# Patient Record
Sex: MALE | Race: WHITE | NOT HISPANIC OR LATINO | URBAN - METROPOLITAN AREA
[De-identification: names, ages, dates, MRNs, and addresses within clinical notes are randomized per-mention and may not be internally consistent; named-entity substitution may affect disease eponyms.]

---

## 2021-01-22 ENCOUNTER — EMERGENCY (EMERGENCY)
Facility: HOSPITAL | Age: 31
LOS: 0 days | Discharge: HOME | End: 2021-01-22
Attending: EMERGENCY MEDICINE
Payer: MEDICAID

## 2021-01-22 VITALS
WEIGHT: 205.03 LBS | TEMPERATURE: 98 F | HEART RATE: 100 BPM | OXYGEN SATURATION: 100 % | SYSTOLIC BLOOD PRESSURE: 152 MMHG | DIASTOLIC BLOOD PRESSURE: 61 MMHG | RESPIRATION RATE: 20 BRPM | HEIGHT: 67 IN
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
## 2021-01-22 DIAGNOSIS — S82.851A DISPLACED TRIMALLEOLAR FRACTURE OF RIGHT LOWER LEG, INITIAL ENCOUNTER FOR CLOSED FRACTURE: ICD-10-CM

## 2021-01-22 DIAGNOSIS — Z20.822 CONTACT WITH AND (SUSPECTED) EXPOSURE TO COVID-19: ICD-10-CM

## 2021-01-22 DIAGNOSIS — M25.571 PAIN IN RIGHT ANKLE AND JOINTS OF RIGHT FOOT: ICD-10-CM

## 2021-01-22 PROCEDURE — 27816 TREATMENT OF ANKLE FRACTURE: CPT

## 2021-01-22 PROCEDURE — U0005: CPT

## 2021-01-22 PROCEDURE — 76376 3D RENDER W/INTRP POSTPROCES: CPT | Mod: 26

## 2021-01-22 PROCEDURE — 99285 EMERGENCY DEPT VISIT HI MDM: CPT | Mod: 25

## 2021-01-22 PROCEDURE — 73610 X-RAY EXAM OF ANKLE: CPT | Mod: 26,RT

## 2021-01-22 PROCEDURE — 76376 3D RENDER W/INTRP POSTPROCES: CPT

## 2021-01-22 PROCEDURE — 27816 TREATMENT OF ANKLE FRACTURE: CPT | Mod: RT

## 2021-01-22 PROCEDURE — 73600 X-RAY EXAM OF ANKLE: CPT | Mod: RT

## 2021-01-22 PROCEDURE — 73590 X-RAY EXAM OF LOWER LEG: CPT | Mod: RT

## 2021-01-22 PROCEDURE — 73700 CT LOWER EXTREMITY W/O DYE: CPT | Mod: 26,RT

## 2021-01-22 PROCEDURE — 73700 CT LOWER EXTREMITY W/O DYE: CPT | Mod: RT

## 2021-01-22 PROCEDURE — 73610 X-RAY EXAM OF ANKLE: CPT | Mod: RT

## 2021-01-22 PROCEDURE — U0003: CPT

## 2021-01-22 PROCEDURE — 99284 EMERGENCY DEPT VISIT MOD MDM: CPT

## 2021-01-22 PROCEDURE — 73590 X-RAY EXAM OF LOWER LEG: CPT | Mod: 26,RT

## 2021-01-22 RX ORDER — ASPIRIN/CALCIUM CARB/MAGNESIUM 324 MG
1 TABLET ORAL
Qty: 14 | Refills: 0
Start: 2021-01-22 | End: 2021-02-04

## 2021-01-22 NOTE — ED STATDOCS - CARE PROVIDER_API CALL
VIDHYA DOCTOR  Phone: [phone]  Fax: (   )    -  Follow Up Time:    Sulaiman Perez (DO)  Orthopaedic Surgery  [address] [place] [address]  Phone: [phone]  Fax: [phone]  Follow Up Time:

## 2021-01-22 NOTE — ED ADULT NURSE NOTE - OBJECTIVE STATEMENT
Patient presents to the ED c/o right ankle pain. Pt states he injured his ankle skateboarding in [place] x3 days ago Sustained eversion injury to right ankle, had XR that showed fracture, was splinted. He called MD Perez, who told him to come to ED for further evaluation. Denies paraesthesias, has some pain, took pain meds PTA.

## 2021-01-22 NOTE — ED STATDOCS - CARE PROVIDERS DIRECT ADDRESSES
,DirectAddress_Unknown ,[email or web address]

## 2021-01-22 NOTE — CONSULT NOTE ADULT - ASSESSMENT
Assessment/Plan:  30y Male with RIGHT Trimalleolar ankle fracture    -Pain control as needed  -NWB RIGHT Lower Extremity in trilam splint  -Keep splint clean, dry, and intact  -Ice and elevation encouraged  -Wiggle toes periodically   -Pt educated on signs/symptoms of compartment syndrome, instructed to return to ER if s/s present, patient expresses full understanding  -Need for for surgical intervention in future discussed  -CT for surgical planning   -No acute/emergent orthopedic surgical intervention needed at this time  -Follow up with Dr. Perez in 3-5 days, please call office for appointment  -Please discharge on Aspirin 325 qD for DVT/PE ppx  -Discussed with attending, who agrees with plan, and will advise if plan changes   -Ortho stable for discharge   Assessment/Plan:  30y Male with RIGHT Trimalleolar ankle fracture    -Pain control as needed  -NWB RIGHT Lower Extremity in trilam splint  -Keep splint clean, dry, and intact  -Ice and elevation encouraged  -Wiggle toes periodically   -Pt educated on signs/symptoms of compartment syndrome, instructed to return to ER if s/s present, patient expresses full understanding  -Need for for surgical intervention in future discussed  -CT for surgical planning   -No acute/emergent orthopedic surgical intervention needed at this time  -Follow up with Dr. Perez on Monday 1/25/2021, please call office for appointment  -Please discharge on Aspirin 325 qD for DVT/PE ppx  -Discussed with attending, who agrees with plan, and will advise if plan changes   -Ortho stable for discharge   Assessment/Plan:  30y Male with RIGHT Trimalleolar ankle fracture    -Pain control as needed  -NWB RIGHT Lower Extremity in trilam splint  -Keep splint clean, dry, and intact  -Ice and elevation encouraged  -Wiggle toes periodically   -Pt educated on signs/symptoms of compartment syndrome, instructed to return to ER if s/s present, patient expresses full understanding  -Need for for surgical intervention in future discussed  -CT for surgical planning   -No acute/emergent orthopedic surgical intervention needed at this time  -Follow up with Dr. Perez on Monday 1/25/2021, please call office for appointment  -FU COVID test  -Please discharge on Aspirin 325 qD for DVT/PE ppx  -Discussed with attending, who agrees with plan, and will advise if plan changes   -Ortho stable for discharge

## 2021-01-22 NOTE — CONSULT NOTE ADULT - SUBJECTIVE AND OBJECTIVE BOX
30y Male presents with RIGHT ankle fracture s/p skateboarding accident 3 days ago while in [place]. Pt states he presented to the ER at [facility] in [place], [place]. Was told the hospital does not take his insurance. Was not seen by orthopedic doctor. No reduction attempt was made at that time. Was placed in splint and returned home to find orthopedic doctor. Was not placed on DVT ppx. Denies numbness/tingling. Denies head strike/LOC/other orthopedic injuries. Patient ambulates without assistance at baseline.    PAST MEDICAL & SURGICAL HISTORY:  No pertinent past medical history      Home Medications:    Allergies    No Known Allergies    Intolerances              Vital Signs Last 24 Hrs  T(C): 36.7 (22 Jan 2021 18:41), Max: 36.7 (22 Jan 2021 18:41)  T(F): 98.1 (22 Jan 2021 18:41), Max: 98.1 (22 Jan 2021 18:41)  HR: 100 (22 Jan 2021 18:41) (100 - 100)  BP: 152/61 (22 Jan 2021 18:41) (152/61 - 152/61)  BP(mean): 84 (22 Jan 2021 18:41) (84 - 84)  RR: 20 (22 Jan 2021 18:41) (20 - 20)  SpO2: 100% (22 Jan 2021 18:41) (100% - 100%)      PHYSICAL EXAM  GEN: NAD, Awake and Alert    RIGHT Lower Extremity:   Skin intact  + Ecchymosis about the ankle   + Swelling about the ankle   + TTP over the medial and lateral malleoli  NTTP over the bony prominences of the hip/knee/foot/toes  Pain with attempted ROM of the ankle  Painless passive/active ROM of the hip/knee/foot/toes  L2-S1 SILT  Wiggles toes  +EHL/FHL  +DP pulses  Compartments soft and compressible  Calf nontender      SECONDARY EXAM: Benign, Skin intact, SILT throughout, motor grossly intact throughout, no other orthopedic injuries at this time, compartments soft and compressible    SPINE: Skin intact, no bony tenderness or step-offs appreciated throughout cervical/thoracic/lumbar/sacral spine    B/l UE: Skin intact, no erythema, ecchymosis, edema, gross deformity, NTTP over the bony prominences of the shoulder/elbow/wrist/hand, painless passive/active ROM of the shoulder/elbow/wrist/hand, C5-T1 SILT, motor grossly intact throughout axillary/musculocutaneous/radial/median/ulnar nerves, + radial pulse    LLE: Skin intact, no erythema, ecchymosis, edema, gross deformity, NTTP over the bony prominences of the hip/knee/ankle/foot, painless passive/active ROM of the hip/knee/ankle/foot, L2-S1 SILT, motor grossly intact throughout Hip Flexors/Quadriceps/Hamstrings/TA/EHL/FHL/GSC, + DP pulses, no pain with log roll, no pain on axial loading, compartments soft and compressible, calf nontender      IMAGING  XR RIGHT Ankle: Trimalleolar equivalent ankle fx with subluxation of ankle joint.  XR RIGHT Tibia/Fibula: No evidence of other fracture or dislocation.    Procedure:  Procedure explained and risks, benefits, and alternatives to procedure discussed with patient at bedside. Patient expressed full understanding and all questions were answered. Under aseptic conditions, a hematoma block was administered to the fracture site using 10cc of 1% lidocaine. Closed reduction was performed and a well molded plaster splint was applied to the affected extremity. The patient tolerated the procedure well and there we no complications. The patient was neurovascularly intact following reduction. Post-reduction x-rays demonstrated acceptable alignment.

## 2021-01-22 NOTE — ED STATDOCS - OBJECTIVE STATEMENT
29 y/o male with no pertinent PMHx presents to the ED c/o right ankle pain. Pt states he injured his ankle skateboarding in [place] x3 days ago Sustained eversion injury to right ankle, had XR that showed fracture, was splinted. He called MD Perez, who told him to come to ED for further evaluation. Denies paraesthesias, has some pain, took pain meds PTA. Denies any other injuries.

## 2021-01-22 NOTE — ED STATDOCS - PROGRESS NOTE DETAILS
Pt. is a 30 year old male presenting with right ankle pain.  Pt. states he was skateboarding while in [place] and broke ankle.  Pt. was evaluated and splinted at a hospital and was told to follow with an orthopedist.  Pt. flew back to [place] and was referred to Dr. Armenta.  The office sent him here.  Pain medications taken PTA.  Splint to RLE in place.  Sylwia Brown PA-C Bimalleolar on xray.  Orthopedics to consult.  Sylwia Brown PA-C Pt. evaluated by orthopedic resident.  Requested COVID swab.  Obtaining CT for better imaging.  Splint in place by them.  Sylwia Brown PA-C Pt. due to call Dr. Perez [phone] on Monday with  QD.  Sylwia Brown PA-C

## 2021-01-22 NOTE — ED STATDOCS - PROVIDER TOKENS
FREE:[LAST:[BITTERMAN],FIRST:[DOCTOR],PHONE:[phone]],FAX:[(   [phone]    -]] PROVIDER:[TOKEN:[[id number]]]

## 2021-01-22 NOTE — ED STATDOCS - PATIENT PORTAL LINK FT
You can access the FollowMyHealth Patient Portal offered by [facility] by registering at the following website: [email or web address]. By joining [facility]’s FollowMyHealth portal, you will also be able to view your health information using other applications (apps) compatible with our system.

## 2021-01-22 NOTE — ED ADULT TRIAGE NOTE - CHIEF COMPLAINT QUOTE
PT C/O RIGHT ANKLE PAIN, PT STATES HE INJURED/FRACTURED HIS ANKLE FROM SKATEBOARDING IN [place] 3 DAYS AGO, THE HOSPITAL DID NOT TAKE HIS INSURANCE, AND FLEW BACK TO [place], MD KEE TOLD HIM TO COME TO ED RIGHT AWAY TO GET HIS ANKLE EVALUATED.  PT TOOK NORCO, TORADOL 2 HRS AGO.

## 2021-01-22 NOTE — ED STATDOCS - NSFOLLOWUPINSTRUCTIONS_ED_ALL_ED_FT
Closed Reduction for Ankle Fracture or Dislocation, Care After      This sheet gives you information about how to care for yourself after your procedure. Your health care provider may also give you more specific instructions. If you have problems or questions, contact your health care provider.      What can I expect after the procedure?  After the procedure, it is common to have:  •Pain or discomfort.      •Swelling.      •Bruising or bluish discoloration.        Follow these instructions at home:    Medicines     •Take over-the-counter and prescription medicines only as told by your health care provider.    •Ask your health care provider if the medicine prescribed to you:   •Requires you to avoid driving or using heavy machinery.     •Can cause constipation. You may need to take actions to prevent or treat constipation, such as:  •Drink enough fluid to keep your urine pale yellow.      •Take over-the-counter or prescription medicines.      •Eat foods that are high in fiber, such as beans, whole grains, and fresh fruits and vegetables.       •Limit foods that are high in fat and processed sugars, such as fried or sweet foods.          If you have a cast, splint, or boot:     •Check the skin around it every day. Tell your health care provider about any concerns.      •Keep it clean.    •If the cast, splint, boot is not waterproof:  •Do not let it get wet.      •Cover it with a watertight covering when you take a bath or shower.        If you have a cast:     • Do not put pressure on any part of the cast until it is fully hardened. This may take several hours.      • Do not stick anything inside the cast to scratch your skin. Doing that increases your risk of infection.      •You may put lotion on dry skin around the edges of the cast. Do not put lotion on the skin underneath the cast.      If you have a splint or boot:     •Wear the splint or boot as told by your health care provider. Remove it only as told by your health care provider.      •Loosen the splint or boot if your toes tingle, become numb, or turn cold and blue.        Managing pain, stiffness, and swelling    •If directed, put ice on your ankle area.  •If you have a removable splint or boot, remove it as told by your health care provider.       •Put ice in a plastic bag.      •Place a towel between your skin and the bag or between your cast and the bag.      •Leave the ice on for 20 minutes, 2–3 times a day.        •Move your toes often to reduce stiffness and swelling.      •Raise (elevate) your ankle above the level of your heart while you are sitting or lying down.      Activity     •Return to your normal activities as told by your health care provider. Ask your health care provider what activities are safe for you.      • Do not use the injured limb to support your body weight until your health care provider says that you can. This may take several weeks. Use crutches, a walker, or a wheelchair as told by your health care provider.      •Do exercises such as specific physical therapy for your ankle as instructed by your health care provider.      Driving     • Do not drive for 24 hours if you were given a sedative during your procedure.      •Ask your health care provider when it is safe to drive if you have a cast, splint, or boot on the foot that you use for driving.      General instructions     • Do not drink alcohol if your health care provider tells you not to drink.      • Do not use any products that contain nicotine or tobacco, such as cigarettes, e-cigarettes, and chewing tobacco. These can delay bone healing. If you need help quitting, ask your health care provider.      •Keep all follow-up visits as told by your health care provider. This is important.        Contact a health care provider if you have:    •A fever.      •Pain that is not controlled by your pain medicine.        Get help right away if you have:    •A severe increase in pain or swelling.      •Toes that tingle or become numb.      •Loss of feeling in your leg, foot, or ankle.      •Toes that are cold and blue.      •Pain, tenderness, or redness in your calf.      •A new rash around your ankle or cast, or if you notice any leakage of fluid from the skin.      •Bleeding around the ankle or underneath or around the cast.      •Chest pain.      •Difficulty breathing.        Summary    •After the procedure, it is common to have pain or discomfort, swelling, and bruising or bluish discoloration around your ankle.      •Follow instructions for taking care of your injury as you recover at home. You may be given specific instructions for taking medicines and wearing a boot, cast, or splint.      •Use ice and medicine to control swelling and pain. Follow instructions on not bearing weight on your injured ankle. You may have to use crutches, a walker, or a wheelchair.      •Contact a health care provider if you have a fever or pain that does not go away.      •Get help right away if your pain worsens or your toes tingle, become numb, or turn cold or blue. Also, get help right away if you have chest pain, tenderness in your calf, a rash around your ankle or cast, or bleeding around the ankle.      This information is not intended to replace advice given to you by your health care provider. Make sure you discuss any questions you have with your health care provider. CALL DR KEE MONDAY -[phone]    Closed Reduction for Ankle Fracture or Dislocation, Care After      This sheet gives you information about how to care for yourself after your procedure. Your health care provider may also give you more specific instructions. If you have problems or questions, contact your health care provider.      What can I expect after the procedure?  After the procedure, it is common to have:  •Pain or discomfort.      •Swelling.      •Bruising or bluish discoloration.        Follow these instructions at home:    Medicines     •Take over-the-counter and prescription medicines only as told by your health care provider.    •Ask your health care provider if the medicine prescribed to you:   •Requires you to avoid driving or using heavy machinery.     •Can cause constipation. You may need to take actions to prevent or treat constipation, such as:  •Drink enough fluid to keep your urine pale yellow.      •Take over-the-counter or prescription medicines.      •Eat foods that are high in fiber, such as beans, whole grains, and fresh fruits and vegetables.       •Limit foods that are high in fat and processed sugars, such as fried or sweet foods.          If you have a cast, splint, or boot:     •Check the skin around it every day. Tell your health care provider about any concerns.      •Keep it clean.    •If the cast, splint, boot is not waterproof:  •Do not let it get wet.      •Cover it with a watertight covering when you take a bath or shower.        If you have a cast:     • Do not put pressure on any part of the cast until it is fully hardened. This may take several hours.      • Do not stick anything inside the cast to scratch your skin. Doing that increases your risk of infection.      •You may put lotion on dry skin around the edges of the cast. Do not put lotion on the skin underneath the cast.      If you have a splint or boot:     •Wear the splint or boot as told by your health care provider. Remove it only as told by your health care provider.      •Loosen the splint or boot if your toes tingle, become numb, or turn cold and blue.        Managing pain, stiffness, and swelling    •If directed, put ice on your ankle area.  •If you have a removable splint or boot, remove it as told by your health care provider.       •Put ice in a plastic bag.      •Place a towel between your skin and the bag or between your cast and the bag.      •Leave the ice on for 20 minutes, 2–3 times a day.        •Move your toes often to reduce stiffness and swelling.      •Raise (elevate) your ankle above the level of your heart while you are sitting or lying down.      Activity     •Return to your normal activities as told by your health care provider. Ask your health care provider what activities are safe for you.      • Do not use the injured limb to support your body weight until your health care provider says that you can. This may take several weeks. Use crutches, a walker, or a wheelchair as told by your health care provider.      •Do exercises such as specific physical therapy for your ankle as instructed by your health care provider.      Driving     • Do not drive for 24 hours if you were given a sedative during your procedure.      •Ask your health care provider when it is safe to drive if you have a cast, splint, or boot on the foot that you use for driving.      General instructions     • Do not drink alcohol if your health care provider tells you not to drink.      • Do not use any products that contain nicotine or tobacco, such as cigarettes, e-cigarettes, and chewing tobacco. These can delay bone healing. If you need help quitting, ask your health care provider.      •Keep all follow-up visits as told by your health care provider. This is important.        Contact a health care provider if you have:    •A fever.      •Pain that is not controlled by your pain medicine.        Get help right away if you have:    •A severe increase in pain or swelling.      •Toes that tingle or become numb.      •Loss of feeling in your leg, foot, or ankle.      •Toes that are cold and blue.      •Pain, tenderness, or redness in your calf.      •A new rash around your ankle or cast, or if you notice any leakage of fluid from the skin.      •Bleeding around the ankle or underneath or around the cast.      •Chest pain.      •Difficulty breathing.        Summary    •After the procedure, it is common to have pain or discomfort, swelling, and bruising or bluish discoloration around your ankle.      •Follow instructions for taking care of your injury as you recover at home. You may be given specific instructions for taking medicines and wearing a boot, cast, or splint.      •Use ice and medicine to control swelling and pain. Follow instructions on not bearing weight on your injured ankle. You may have to use crutches, a walker, or a wheelchair.      •Contact a health care provider if you have a fever or pain that does not go away.      •Get help right away if your pain worsens or your toes tingle, become numb, or turn cold or blue. Also, get help right away if you have chest pain, tenderness in your calf, a rash around your ankle or cast, or bleeding around the ankle.      This information is not intended to replace advice given to you by your health care provider. Make sure you discuss any questions you have with your health care provider.

## 2021-01-23 LAB — SARS-COV-2 RNA SPEC QL NAA+PROBE: SIGNIFICANT CHANGE UP

## 2021-01-25 ENCOUNTER — APPOINTMENT (OUTPATIENT)
Dept: ORTHOPEDIC SURGERY | Facility: CLINIC | Age: 31
End: 2021-01-25
Payer: MEDICAID

## 2021-01-25 VITALS
BODY MASS INDEX: 32.96 KG/M2 | HEIGHT: 67 IN | DIASTOLIC BLOOD PRESSURE: 85 MMHG | SYSTOLIC BLOOD PRESSURE: 132 MMHG | WEIGHT: 210 LBS | HEART RATE: 90 BPM

## 2021-01-25 PROBLEM — Z00.00 ENCOUNTER FOR PREVENTIVE HEALTH EXAMINATION: Status: ACTIVE | Noted: 2021-01-25

## 2021-01-25 PROCEDURE — 99072 ADDL SUPL MATRL&STAF TM PHE: CPT

## 2021-01-25 PROCEDURE — 99204 OFFICE O/P NEW MOD 45 MIN: CPT

## 2021-01-26 ENCOUNTER — APPOINTMENT (OUTPATIENT)
Dept: DISASTER EMERGENCY | Facility: CLINIC | Age: 31
End: 2021-01-26

## 2021-01-26 DIAGNOSIS — Z01.818 ENCOUNTER FOR OTHER PREPROCEDURAL EXAMINATION: ICD-10-CM

## 2021-01-27 LAB — SARS-COV-2 N GENE NPH QL NAA+PROBE: NOT DETECTED

## 2021-01-27 NOTE — DISCUSSION/SUMMARY
[de-identified] : Today I had a lengthy discussion with the patient regarding their right ankle pain. I have addressed all the patient's concerns surrounding the pathology of their condition. I advised the patient to utilize 325 mg of aspirin as instructed for blood thinning purposes. A discussion was had about a right ankle ORIF surgery. A lengthy discussion was had about the surgery. All risks, benefits and alternatives to the recommended surgical procedure were discussed which include but are not limited to bleeding, infection, nerve damage, vascular damage, failure of the wound to heal, the need for further surgery, loss of limb, DVT, PE, loss of life as well as the risks associated with general anesthesia. The patient verbalized understanding and provided informed consent to move forward with surgery. The patient understood and verbally agreed to the treatment plan. All of their questions were answered and they were satisfied with the visit. The patient should call the office if they have any questions or experience worsening symptoms.

## 2021-01-27 NOTE — ADDENDUM
[FreeTextEntry1] : I, Jhoan Stephen, acted solely as a scribe for Dr. uSlaiman Perez on this date 01/25/2021 .\par All medical record entries made by the Scribe were at my, Dr. Sulaiman Perez, direction and personally dictated by me on 01/25/2021 . I have reviewed the chart and agree that the record accurately reflects my personal performance of the history, physical exam, assessment and plan. I have also personally directed, reviewed, and agreed with the chart.

## 2021-01-27 NOTE — CONSULT LETTER
[Consult Letter:] : I had the pleasure of evaluating your patient, [unfilled]. [Please see my note below.] : Please see my note below. [Consult Closing:] : Thank you very much for allowing me to participate in the care of this patient.  If you have any questions, please do not hesitate to contact me. [Sincerely,] : Sincerely, [FreeTextEntry3] : Sulaiman Perez, DO\par Foot and Ankle Surgery\par

## 2021-01-27 NOTE — REVIEW OF SYSTEMS
[Joint Pain] : joint pain [Joint Swelling] : joint swelling [Negative] : Heme/Lymph [FreeTextEntry9] : R ankle

## 2021-01-27 NOTE — PHYSICAL EXAM
[de-identified] : Right Ankle Exam\par \par General: Alert and oriented x3.  In no acute distress.  Pleasant in nature with a normal affect.  No apparent respiratory distress. \par Erythema, Warmth, Rubor: Negative\par Swelling: Positive swelling.  No fractures blisters present after windowing the splint.\par \par Pulses: 2+ DP/PT Pulses\par \par Neuro: Intact motor and sensory\par \par  [de-identified] : \par EXAM: XR TIB FIB AP LAT 2 VIEWS RT\par \par EXAM: XR ANKLE POST RED 2 VIEWS RT\par \par EXAM: XR ANKLE COMP MIN 3 VIEWS RT\par \par \par PROCEDURE DATE: 01/22/2021\par \par \par \par INTERPRETATION: CLINICAL INDICATION: right ankle injury pain and swelling\par \par EXAM:\par Frontal, oblique, and lateral views of the right ankle pre and post reduction and AP and lateral right leg from 1/22/2021.\par \par IMPRESSION:\par Splint material surrounds the extremity.\par \par Posteriorly displaced obliquely oriented distal fibular fracture extending to joint line level and vertically oriented distal tibial posterior malleolar fracture.\par \par Widened medial mortise clear space indicative of concomitant ligamentous injury and instability as well.\par \par Postreduction images demonstrate slightly decreased fracture fragment displacement and restored ankle mortise congruence.\par \par No dislocations or additional fractures in remaining imaged regions. No stigmata of pre-existing underlying arthropathy in the knee ankle and visualized midfoot and hindfoot joint spaces.\par \par \par \par \par \par BALWINDER DORAN MD; Attending Radiologist\par This document has been electronically signed. Jan 23 2021 3:20PM

## 2021-01-27 NOTE — HISTORY OF PRESENT ILLNESS
[FreeTextEntry1] : Oscar is a 29 yo male who presents with a right ankle fracture after he fell in [place] on January 20, 2020.  He fell off his skateboard and fractured the ankle.  Pain scale 8/10.  He presents today non weight bearing in a splint with crutches. The patient full sensation in his foot.  No other complaints.

## 2021-01-28 RX ORDER — MEPERIDINE HYDROCHLORIDE 50 MG/ML
12.5 INJECTION INTRAMUSCULAR; INTRAVENOUS; SUBCUTANEOUS
Refills: 0 | Status: DISCONTINUED | OUTPATIENT
Start: 2021-01-29 | End: 2021-01-29

## 2021-01-28 RX ORDER — ONDANSETRON 8 MG/1
4 TABLET, FILM COATED ORAL ONCE
Refills: 0 | Status: DISCONTINUED | OUTPATIENT
Start: 2021-01-29 | End: 2021-01-29

## 2021-01-28 RX ORDER — HYDROMORPHONE HYDROCHLORIDE 2 MG/ML
0.5 INJECTION INTRAMUSCULAR; INTRAVENOUS; SUBCUTANEOUS
Refills: 0 | Status: DISCONTINUED | OUTPATIENT
Start: 2021-01-29 | End: 2021-01-29

## 2021-01-28 RX ORDER — SODIUM CHLORIDE 9 MG/ML
1000 INJECTION, SOLUTION INTRAVENOUS
Refills: 0 | Status: DISCONTINUED | OUTPATIENT
Start: 2021-01-29 | End: 2021-01-29

## 2021-01-28 RX ORDER — FENTANYL CITRATE 50 UG/ML
50 INJECTION INTRAVENOUS
Refills: 0 | Status: DISCONTINUED | OUTPATIENT
Start: 2021-01-29 | End: 2021-01-29

## 2021-01-29 ENCOUNTER — RESULT REVIEW (OUTPATIENT)
Age: 31
End: 2021-01-29

## 2021-01-29 ENCOUNTER — APPOINTMENT (OUTPATIENT)
Dept: ORTHOPEDIC SURGERY | Facility: HOSPITAL | Age: 31
End: 2021-01-29

## 2021-01-29 ENCOUNTER — OUTPATIENT (OUTPATIENT)
Dept: INPATIENT UNIT | Facility: HOSPITAL | Age: 31
LOS: 1 days | Discharge: HOME | End: 2021-01-29
Payer: MEDICAID

## 2021-01-29 VITALS
DIASTOLIC BLOOD PRESSURE: 91 MMHG | RESPIRATION RATE: 18 BRPM | TEMPERATURE: 99 F | OXYGEN SATURATION: 97 % | SYSTOLIC BLOOD PRESSURE: 146 MMHG | HEART RATE: 97 BPM

## 2021-01-29 VITALS
RESPIRATION RATE: 14 BRPM | SYSTOLIC BLOOD PRESSURE: 138 MMHG | OXYGEN SATURATION: 98 % | DIASTOLIC BLOOD PRESSURE: 92 MMHG | HEART RATE: 101 BPM | WEIGHT: 210.1 LBS | HEIGHT: 67 IN | TEMPERATURE: 97 F

## 2021-01-29 DIAGNOSIS — S82.851A DISPLACED TRIMALLEOLAR FRACTURE OF RIGHT LOWER LEG, INITIAL ENCOUNTER FOR CLOSED FRACTURE: ICD-10-CM

## 2021-01-29 DIAGNOSIS — K40.20 BILATERAL INGUINAL HERNIA, WITHOUT OBSTRUCTION OR GANGRENE, NOT SPECIFIED AS RECURRENT: Chronic | ICD-10-CM

## 2021-01-29 PROCEDURE — 27822 TREATMENT OF ANKLE FRACTURE: CPT | Mod: 58,RT

## 2021-01-29 PROCEDURE — 76000 FLUOROSCOPY <1 HR PHYS/QHP: CPT

## 2021-01-29 PROCEDURE — 29898 ANKLE ARTHROSCOPY/SURGERY: CPT | Mod: 58,RT

## 2021-01-29 PROCEDURE — C1713: CPT

## 2021-01-29 PROCEDURE — 27829 TREAT LOWER LEG JOINT: CPT | Mod: 58,RT

## 2021-01-29 RX ORDER — OXYCODONE HYDROCHLORIDE 5 MG/1
10 TABLET ORAL ONCE
Refills: 0 | Status: DISCONTINUED | OUTPATIENT
Start: 2021-01-29 | End: 2021-01-29

## 2021-01-29 RX ORDER — ONDANSETRON 4 MG/1
4 TABLET ORAL
Qty: 30 | Refills: 0 | Status: ACTIVE | COMMUNITY
Start: 2021-01-29 | End: 1900-01-01

## 2021-01-29 RX ORDER — ASPIRIN 325 MG/1
325 TABLET, FILM COATED ORAL
Qty: 30 | Refills: 0 | Status: ACTIVE | COMMUNITY
Start: 2021-01-29 | End: 1900-01-01

## 2021-01-29 RX ORDER — ONDANSETRON 8 MG/1
1 TABLET, FILM COATED ORAL
Qty: 10 | Refills: 0
Start: 2021-01-29 | End: 2021-02-04

## 2021-01-29 RX ORDER — CELECOXIB 200 MG/1
200 CAPSULE ORAL ONCE
Refills: 0 | Status: COMPLETED | OUTPATIENT
Start: 2021-01-29 | End: 2021-01-29

## 2021-01-29 RX ORDER — FAMOTIDINE 10 MG/ML
20 INJECTION INTRAVENOUS ONCE
Refills: 0 | Status: COMPLETED | OUTPATIENT
Start: 2021-01-29 | End: 2021-01-29

## 2021-01-29 RX ORDER — METOCLOPRAMIDE HCL 10 MG
10 TABLET ORAL ONCE
Refills: 0 | Status: COMPLETED | OUTPATIENT
Start: 2021-01-29 | End: 2021-01-29

## 2021-01-29 RX ORDER — OXYCODONE 5 MG/1
5 TABLET ORAL
Qty: 30 | Refills: 0 | Status: ACTIVE | COMMUNITY
Start: 2021-01-29 | End: 1900-01-01

## 2021-01-29 RX ORDER — DOCUSATE SODIUM 100 MG
1 CAPSULE ORAL
Qty: 60 | Refills: 0
Start: 2021-01-29 | End: 2021-02-12

## 2021-01-29 RX ORDER — ACETAMINOPHEN 500 MG
975 TABLET ORAL ONCE
Refills: 0 | Status: COMPLETED | OUTPATIENT
Start: 2021-01-29 | End: 2021-01-29

## 2021-01-29 RX ORDER — ASPIRIN/CALCIUM CARB/MAGNESIUM 324 MG
1 TABLET ORAL
Qty: 30 | Refills: 0
Start: 2021-01-29 | End: 2021-02-27

## 2021-01-29 RX ORDER — LABETALOL HCL 100 MG
10 TABLET ORAL ONCE
Refills: 0 | Status: COMPLETED | OUTPATIENT
Start: 2021-01-29 | End: 2021-01-29

## 2021-01-29 RX ORDER — MUPIROCIN 20 MG/G
0 OINTMENT TOPICAL
Qty: 0 | Refills: 0 | DISCHARGE

## 2021-01-29 RX ORDER — DOCUSATE SODIUM 100 MG/1
100 CAPSULE ORAL TWICE DAILY
Qty: 30 | Refills: 0 | Status: ACTIVE | COMMUNITY
Start: 2021-01-29 | End: 1900-01-01

## 2021-01-29 RX ORDER — METOPROLOL TARTRATE 50 MG
3 TABLET ORAL ONCE
Refills: 0 | Status: COMPLETED | OUTPATIENT
Start: 2021-01-29 | End: 2021-01-29

## 2021-01-29 RX ORDER — OXYCODONE HYDROCHLORIDE 5 MG/1
1 TABLET ORAL
Qty: 20 | Refills: 0
Start: 2021-01-29 | End: 2021-02-04

## 2021-01-29 RX ADMIN — HYDROMORPHONE HYDROCHLORIDE 0.5 MILLIGRAM(S): 2 INJECTION INTRAMUSCULAR; INTRAVENOUS; SUBCUTANEOUS at 15:06

## 2021-01-29 RX ADMIN — Medication 3 MILLIGRAM(S): at 13:33

## 2021-01-29 RX ADMIN — Medication 10 MILLIGRAM(S): at 09:01

## 2021-01-29 RX ADMIN — OXYCODONE HYDROCHLORIDE 10 MILLIGRAM(S): 5 TABLET ORAL at 13:58

## 2021-01-29 RX ADMIN — Medication 975 MILLIGRAM(S): at 09:01

## 2021-01-29 RX ADMIN — CELECOXIB 200 MILLIGRAM(S): 200 CAPSULE ORAL at 09:01

## 2021-01-29 RX ADMIN — FAMOTIDINE 20 MILLIGRAM(S): 10 INJECTION INTRAVENOUS at 09:01

## 2021-01-29 RX ADMIN — HYDROMORPHONE HYDROCHLORIDE 0.5 MILLIGRAM(S): 2 INJECTION INTRAMUSCULAR; INTRAVENOUS; SUBCUTANEOUS at 15:11

## 2021-01-29 RX ADMIN — FENTANYL CITRATE 50 MICROGRAM(S): 50 INJECTION INTRAVENOUS at 13:58

## 2021-01-29 RX ADMIN — FENTANYL CITRATE 50 MICROGRAM(S): 50 INJECTION INTRAVENOUS at 14:07

## 2021-01-29 RX ADMIN — Medication 10 MILLIGRAM(S): at 14:53

## 2021-01-29 NOTE — ASU DISCHARGE PLAN (ADULT/PEDIATRIC) - ASU DC SPECIAL INSTRUCTIONSFT
Post-Operative Instructions    PRESCRIPTIONS: your post-operative medications have been handed to you or sent to the pharmacy you indicated at your pre-operative visit.  If you have any difficulty obtaining your post-operative medications or have any questions, please call the office at [phone].      PAIN MANAGEMENT: You should expect to have discomfort for the first week or so after surgery. Pain medication should be taken to help alleviate the pain so that you are comfortable and can participate in physical therapy.  Take the medication as directed.  You may decrease the amount of pain medication, as tolerated, when pain improves.  You must exercise caution when operating a motor vehicle. You have been prescribed one or more of the following as indicated on your Med Rec form that the Nurse will go over with you:  [  ] Oxycodone 5mg 1-2 tablets by mouth every 4 to 6 hours as needed for pain  Oxycodone is a short-acting pain medication routinely prescribed after surgery.  It is the pain medication found in “Percocet,” which is a combination of oxycodone and Tylenol.  If you were prescribed oxycodone, you may take Tylenol in addition to this medication, if needed for pain control.  [  ] Oxycontin 10mg by mouth every 12 hours for 5 days  Oxycontin is a long-acting pain medication.  It is sometimes prescribed after longer surgeries. If you were prescribed this medication, you should take it twice a day for the first 5 days after surgery to help with pain control.  [  ] Vicodin or Norco (Hydrocodone 5mg/Tylenol 325mg) 1-2 tablets by mouth every 4-6 hours as needed for pain  Vicodin and Norco are short acting pain medications sometimes prescribed after surgery.  If you were prescribed this medication, you may take 1-2 tablets every 4-6 hours as needed for pain.  This medication already contains Tylenol.  You should NOT take any additional Tylenol (acetaminophen) if you are taking these medications.    NAUSEA: Nausea is a common side effect of anesthesia and pain medications.  You may take the below medication if you are experiencing nausea after surgery.  If you continue to experience nausea or vomiting more than 24 hours after surgery, please call the office.  [  ] Ondansetron 4mg by mouth every 4 hours as needed for nausea    CONSTIPATION: common side effect of anesthesia and pain medications.  You should take Colace three times daily, as long as you are taking narcotic pain medications after surgery, such as oxycodone, oxycontin, vicodin, or norco.  [  ] Colace 100mg by mouth three times daily    DVT PROPHYLAXIS (PREVENTION OF BLOOD CLOTS): Aspirin EC can reduce the risk of blood clots after surgery, particularly after surgery on the legs, ankles and feet.  If you have been prescribed Aspirin, it is essential that you take this medication.  If you already are on an anticoagulant (blood thinner) such as Xarelto, Coumadin, Warfarin, Eliquis - you should resume you home "blood-thinner" in place of the Aspirin.  [  ] Aspirin 325mg ENTERIC COATED (EC) by mouth once daily for 2-4 weeks (depending on surgical procedure)    ACTIVITY: You should be up and moving as much and as often as possible! Do NOT walk or put bodyweight on your splint or surgical side. Use Crutches or walker. You must keep your bandage/splint dry. Do NOT get it wet. IF you shower it MUST be covered tightly with a garbage bag. Otherwise sponge bath is advised. You do NOT want wet bandages on your skin as they can cause skin breakdown under the splint.    BANDAGE: Your bandage will be changed in the office. Do NOT remove it yourself. IF it gets damaged or wet (soaked) call. Follow up about 7-10 days in office or as otherwise advised by Dr. Perez if different.

## 2021-01-29 NOTE — ASU DISCHARGE PLAN (ADULT/PEDIATRIC) - CARE PROVIDER_API CALL
Sulaiman Perez (DO)  Orthopaedic Surgery  [address] [place] [address]  Phone: [phone]  Fax: [phone]  Follow Up Time:

## 2021-01-29 NOTE — PROCEDURAL SAFETY CHECKLIST WITH OR WITHOUT SEDATION - NSTIMEOUTDATE_GEN_ALL_CORE
29-Jan-2021 16:00
Physical Exam    Vital Signs: I have reviewed the initial vital signs.  Constitutional: well-nourished, appears stated age, no acute distress  Eye: PERRL, EOMI. VA 20/25 OD, 20/40 OS.  + FB noted to left eye  Neuro: AOx3, No focal deficits noted

## 2021-01-29 NOTE — ASU DISCHARGE PLAN (ADULT/PEDIATRIC) - CALL YOUR DOCTOR IF YOU HAVE ANY OF THE FOLLOWING:
Bleeding that does not stop/Swelling that gets worse/Pain not relieved by Medications/Fever greater than (need to indicate Fahrenheit or Celsius)/Wound/Surgical Site with redness, or foul smelling discharge or pus/Numbness, tingling, color or temperature change to extremity
no fever

## 2021-01-29 NOTE — BRIEF OPERATIVE NOTE - OPERATION/FINDINGS
right ankle ORIF, 1/3 tubular distal fibula plate with syndesmotic fixation using suture button, nanoscope intraarticular evaluation with debridement and washout.

## 2021-02-06 DIAGNOSIS — S82.851A DISPLACED TRIMALLEOLAR FRACTURE OF RIGHT LOWER LEG, INITIAL ENCOUNTER FOR CLOSED FRACTURE: ICD-10-CM

## 2021-02-06 DIAGNOSIS — M25.871 OTHER SPECIFIED JOINT DISORDERS, RIGHT ANKLE AND FOOT: ICD-10-CM

## 2021-02-06 DIAGNOSIS — S93.431A SPRAIN OF TIBIOFIBULAR LIGAMENT OF RIGHT ANKLE, INITIAL ENCOUNTER: ICD-10-CM
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
## 2021-02-06 DIAGNOSIS — Z86.16 PERSONAL HISTORY OF COVID-19: ICD-10-CM
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
## 2021-02-12 ENCOUNTER — APPOINTMENT (OUTPATIENT)
Dept: ORTHOPEDIC SURGERY | Facility: CLINIC | Age: 31
End: 2021-02-12
Payer: MEDICAID

## 2021-02-12 PROBLEM — U07.1 COVID-19: Chronic | Status: ACTIVE | Noted: 2021-01-29

## 2021-02-12 PROCEDURE — 99024 POSTOP FOLLOW-UP VISIT: CPT

## 2021-02-12 PROCEDURE — 73610 X-RAY EXAM OF ANKLE: CPT | Mod: RT

## 2021-02-12 PROCEDURE — 97760 ORTHOTIC MGMT&TRAING 1ST ENC: CPT | Mod: 58

## 2021-02-12 RX ORDER — OXYCODONE 5 MG/1
5 TABLET ORAL EVERY 6 HOURS
Qty: 28 | Refills: 0 | Status: ACTIVE | COMMUNITY
Start: 2021-02-12 | End: 1900-01-01

## 2021-02-12 NOTE — ADDENDUM
[FreeTextEntry1] : I, Artur Nagel, acted solely as a scribe for Dr. Sulaiman Perez on this date 02/12/2021. \par All medical record entries made by the Scribe were at my, Dr. Sulaiman Perez, direction and personally dictated by me on 02/12/2021 . I have reviewed the chart and agree that the record accurately reflects my personal performance of the history, physical exam, assessment and plan. I have also personally directed, reviewed, and agreed with the chart.

## 2021-02-12 NOTE — HISTORY OF PRESENT ILLNESS
[___ Days Post Op] : post op day #[unfilled] [Clean/Dry/Intact] : clean, dry and intact [Healed] : healed [Neuro Intact] : an unremarkable neurological exam [Vascular Intact] : ~T peripheral vascular exam normal [Negative Sofya's] : maneuvers demonstrated a negative Sofya's sign [Doing Well] : is doing well [Excellent Pain Control] : has excellent pain control [No Sign of Infection] : is showing no signs of infection [Sutures Removed] : sutures were removed [Chills] : no chills [Fever] : no fever [Nausea] : no nausea [Vomiting] : no vomiting [Erythema] : not erythematous [Discharge] : absent of discharge [Swelling] : not swollen [Dehiscence] : not dehisced [de-identified] : s/p 1. ORIF of right trimalleolar ankle fracture without posterior lip fixation., 2. Right distal tibiofibular syndesmosis ORIF., 3. Right ankle arthroscopy with extensive synovectomy.\par DOS 1/29/2021 [de-identified] : 30 year old male present in the office 14 days post op from  ORIF of right trimalleolar ankle fracture without posterior lip fixation, Right distal tibiofibular syndesmosis ORIF, Right ankle arthroscopy with extensive synovectomy. The patient presents in a splint and ambulating NWB with crutches. The patient has been taking aspirin for blood thinning purposes. No other complaints at this time.  He notes his pain is controlled with medications. [de-identified] : General: Alert and oriented x3. In no acute distress. Pleasant in nature with a normal affect. No apparent respiratory distress. The wound is intact. Sutures removed, staples removed. No evidence of any diastases or dehiscence. No surrounding erythema noted. No fluctuance. The area is warm and well perfused. The patient is able to wiggle their toes. Neurovascularly intact.  [de-identified] : 3V of the right ankle were ordered, obtained and reviewed by me today, 02/12/2021, revealed: Hardware in good position. [de-identified] : Patient is a 30 year old male present in the office today 14 days s/p ORIF of right trimalleolar ankle fracture without posterior lip fixation, Right distal tibiofibular syndesmosis ORIF, Right ankle arthroscopy with extensive synovectomy. The patient's splint was removed in the office today. I recommend that the patient should continue to take aspirin for DVT prophylaxis until further notice. I recommend that the patient utilize a CAM boot, non weight bearing. The patient was fitted for the CAM boot in the office today. The patient was educated about the boot wear pattern and utilization, as well as the timeframe to come out of the boot. He was also given full instructions for using the boot. He should remain NWB with crutches until further notice. I would like to see the patient back in the office in 2-3 weeks to reassess his condition. We will start formal physical therapy at that time. I have addressed all the patient's concerns surrounding the pathology of their condition. The patient understood and verbally agreed to the treatment plan. All of their questions were answered and they were satisfied with the visit. The patient should call the office if they have any questions or experience worsening symptoms.

## 2021-03-01 ENCOUNTER — APPOINTMENT (OUTPATIENT)
Dept: ORTHOPEDIC SURGERY | Facility: CLINIC | Age: 31
End: 2021-03-01
Payer: MEDICAID

## 2021-03-01 PROCEDURE — 99024 POSTOP FOLLOW-UP VISIT: CPT

## 2021-03-01 PROCEDURE — 73610 X-RAY EXAM OF ANKLE: CPT | Mod: RT

## 2021-03-01 RX ORDER — OXYCODONE AND ACETAMINOPHEN 5; 325 MG/1; MG/1
5-325 TABLET ORAL
Qty: 30 | Refills: 0 | Status: ACTIVE | COMMUNITY
Start: 2021-03-01 | End: 1900-01-01

## 2021-03-01 NOTE — ADDENDUM
[FreeTextEntry1] : I, Jhoan Stephen, acted solely as a scribe for Dr. Sulaiman Perez on this date 03/01/2021 .\par All medical record entries made by the Scribe were at my, Dr. Sulaiman Perez, direction and personally dictated by me on 03/01/2021 . I have reviewed the chart and agree that the record accurately reflects my personal performance of the history, physical exam, assessment and plan. I have also personally directed, reviewed, and agreed with the chart.

## 2021-03-01 NOTE — HISTORY OF PRESENT ILLNESS
[___ Weeks Post Op] : [unfilled] weeks post op [Clean/Dry/Intact] : clean, dry and intact [Healed] : healed [Vascular Intact] : ~T peripheral vascular exam normal [Negative Sofya's] : maneuvers demonstrated a negative Sofya's sign [Doing Well] : is doing well [Excellent Pain Control] : has excellent pain control [No Sign of Infection] : is showing no signs of infection [Chills] : no chills [Fever] : no fever [Nausea] : no nausea [Vomiting] : no vomiting [Erythema] : not erythematous [Discharge] : absent of discharge [Swelling] : not swollen [Dehiscence] : not dehisced [de-identified] : s/p 1. ORIF of right trimalleolar ankle fracture without posterior lip fixation., 2. Right distal tibiofibular syndesmosis ORIF., 3. Right ankle arthroscopy with extensive synovectomy.\par DOS 1/29/2021.  [de-identified] : 31 year old male present in the office 4 weeks post op from 1. ORIF of right trimalleolar ankle fracture without posterior lip fixation., 2. Right distal tibiofibular syndesmosis ORIF., 3. Right ankle arthroscopy with extensive synovectomy. The patient's pain is noted to be a 1/10. The pain and swelling are noted to be the same compared to the previous visit.  He c/o some stiffness in the ankle. The patient presents wearing a CAM boot. The patient presents ambulating in crutches. He is currently taking oxycodone as needed. No other complaints at this time.  [de-identified] : General: Alert and oriented x3. In no acute distress. Pleasant in nature with a normal affect. No apparent respiratory distress.\par The wound is intact. No evidence of any diastases or dehiscence. No surrounding erythema noted. No fluctuance. The area is warm and well perfused. The patient is able to wiggle their toes. Neurovascularly intact. No pain medially.   [de-identified] : 3V of the right ankle were ordered obtained and reviewed by me today, 03/01/2021 , revealed: Hardware in good position.  [de-identified] : Patient is a 31 year old male present in the office today 4 weeks s/p 1. ORIF of right trimalleolar ankle fracture without posterior lip fixation., 2. Right distal tibiofibular syndesmosis ORIF., 3. Right ankle arthroscopy with extensive synovectomy. I recommend the patient undergo a course of physical therapy for the right ankle 2-3 times a week for a total of 6-8 weeks. A prescription was given for the physical therapy today. The patient can begin weight bearing as tolerated with physical therapy assistance. I recommend that the patient transition out of their CAM boot and into an ASO brace as tolerated. The patient was provided with the ASO brace in the office today. I would like to see the patient back in the office in 6-8 weeks to reassess their condition. I have addressed all the patient's concerns surrounding the pathology of their condition. The patient understood and verbally agreed to the treatment plan. All of their questions were answered and they were satisfied with the visit. The patient should call the office if they have any questions or experience worsening symptoms.

## 2021-04-12 ENCOUNTER — APPOINTMENT (OUTPATIENT)
Dept: ORTHOPEDIC SURGERY | Facility: CLINIC | Age: 31
End: 2021-04-12
Payer: MEDICAID

## 2021-04-12 DIAGNOSIS — S82.851A DISPLACED TRIMALLEOLAR FRACTURE OF RIGHT LOWER LEG, INITIAL ENCOUNTER FOR CLOSED FRACTURE: ICD-10-CM

## 2021-04-12 DIAGNOSIS — Z87.81 OTHER SPECIFIED POSTPROCEDURAL STATES: ICD-10-CM

## 2021-04-12 DIAGNOSIS — S93.421A SPRAIN OF DELTOID LIGAMENT OF RIGHT ANKLE, INITIAL ENCOUNTER: ICD-10-CM

## 2021-04-12 DIAGNOSIS — S93.431A SPRAIN OF TIBIOFIBULAR LIGAMENT OF RIGHT ANKLE, INITIAL ENCOUNTER: ICD-10-CM

## 2021-04-12 DIAGNOSIS — Z98.890 OTHER SPECIFIED POSTPROCEDURAL STATES: ICD-10-CM

## 2021-04-12 PROCEDURE — 99024 POSTOP FOLLOW-UP VISIT: CPT

## 2021-04-12 PROCEDURE — 73610 X-RAY EXAM OF ANKLE: CPT | Mod: RT

## 2021-04-12 RX ORDER — KETOROLAC TROMETHAMINE 10 MG/1
10 TABLET, FILM COATED ORAL
Qty: 15 | Refills: 0 | Status: ACTIVE | COMMUNITY
Start: 2021-01-21

## 2021-04-12 RX ORDER — ONDANSETRON 4 MG/1
4 TABLET, ORALLY DISINTEGRATING ORAL
Qty: 10 | Refills: 0 | Status: ACTIVE | COMMUNITY
Start: 2021-01-29

## 2021-04-12 RX ORDER — ASPIRIN 325 MG/1
325 TABLET, COATED ORAL
Qty: 30 | Refills: 0 | Status: ACTIVE | COMMUNITY
Start: 2021-01-29

## 2021-04-12 RX ORDER — HYDROCODONE BITARTRATE AND ACETAMINOPHEN 5; 325 MG/1; MG/1
5-325 TABLET ORAL
Qty: 12 | Refills: 0 | Status: ACTIVE | COMMUNITY
Start: 2021-01-21

## 2021-04-12 NOTE — ADDENDUM
[FreeTextEntry1] : I, Jhoan Stephen, acted solely as a scribe for Dr. Sulaiman Perez on this date 04/12/2021 .\par All medical record entries made by the Scribe were at my, Dr. Sulaiman Perez, direction and personally dictated by me on 04/12/2021 . I have reviewed the chart and agree that the record accurately reflects my personal performance of the history, physical exam, assessment and plan. I have also personally directed, reviewed, and agreed with the chart.

## 2021-04-12 NOTE — HISTORY OF PRESENT ILLNESS
[___ Weeks Post Op] : [unfilled] weeks post op [Clean/Dry/Intact] : clean, dry and intact [Healed] : healed [Neuro Intact] : an unremarkable neurological exam [Vascular Intact] : ~T peripheral vascular exam normal [Negative Sofya's] : maneuvers demonstrated a negative Sofya's sign [Doing Well] : is doing well [Excellent Pain Control] : has excellent pain control [No Sign of Infection] : is showing no signs of infection [Chills] : no chills [Fever] : no fever [Nausea] : no nausea [Vomiting] : no vomiting [Erythema] : not erythematous [Discharge] : absent of discharge [Swelling] : not swollen [Dehiscence] : not dehisced [de-identified] : s/p 1. ORIF of right trimalleolar ankle fracture without posterior lip fixation., 2. Right distal tibiofibular syndesmosis ORIF., 3. Right ankle arthroscopy with extensive synovectomy.\par DOS 1/29/2021.  [de-identified] : 31 year old male present in the office 10 weeks post op from 1. ORIF of right trimalleolar ankle fracture without posterior lip fixation., 2. Right distal tibiofibular syndesmosis ORIF., 3. Right ankle arthroscopy with extensive synovectomy. The patient's pain is noted to be a 1/10. The pain and swelling are noted to be 100% improved compared to the previous visit. The patient has been attending physical therapy for this issue. He is not currently taking any pain medication. No other complaints at this time. Wearing ASO brace with walking support.  [de-identified] : Right Ankle Exam\par \par General: Alert and oriented x3.  In no acute distress.  Pleasant in nature with a normal affect.  No apparent respiratory distress. \par Erythema, Warmth, Rubor: Negative\par Swelling: Positive\par \par ROM:\par 1. Dorsiflexion: 0 degrees\par 2. Plantarflexion: 40 degrees\par 3. Inversion: 10 degrees\par 4. Eversion: 10 degrees\par \par Tenderness to Palpation: \par 1. Lateral Malleolus: Negative\par 2. Medial Malleolus: Negative\par 3. Proximal Fibular Pain: Negative\par 4. Heel Pain: Negative\par \par Ligament Pain:\par 1. ATFL/CFL/PTFL: Negative\par 2. Deltoid Ligaments: Negative\par \par Stability: \par 1. Anterior Drawer: Negative\par 2. Posterior Drawer: Negative\par \par Strength: 5/5 TA/GS/EHL\par \par Pulses: 2+ DP/PT Pulses\par \par Neuro: Intact motor and sensory\par \par Additional Test:\par 1. Rowell's Test: Negative\par 2. Syndesmosis Squeeze Test: Negative\par \par Very mild TTP when palpating medial side of ankle.\par \par \par \par  [de-identified] : 3V of the right ankle were ordered obtained and reviewed by me today, 04/12/2021 , revealed: Hardware in good position. Fracture lines present but look healed.  [de-identified] : Patient is a 31 year old male present in the office today 10 weeks s/p 1. ORIF of right trimalleolar ankle fracture without posterior lip fixation., 2. Right distal tibiofibular syndesmosis ORIF., 3. Right ankle arthroscopy with extensive synovectomy.\par \par \par The patient will continue with physical therapy working on ankle strength and last bit of motion specifically dorsiflexion of the ankle.  He will use OTC pain meds as needed.  He was provided with a new PT RX today.  He will follow-up in 3 months. All questions answered.  Over time he will slowly get rid of the brace as the patient feels comfortable and gains trust and strength in the ankle.

## 2021-07-12 ENCOUNTER — APPOINTMENT (OUTPATIENT)
Dept: ORTHOPEDIC SURGERY | Facility: CLINIC | Age: 31
End: 2021-07-12

## 2022-10-02 NOTE — ED STATDOCS - CLINICAL SUMMARY MEDICAL DECISION MAKING FREE TEXT BOX
The patient is a 47y year old Male complaining of  Pt. due to call Dr. Perez [phone] on Monday with  QD.  Sylwia Brown PA-C

## 2023-07-07 NOTE — ED STATDOCS - NS ED SCRIBE STATEMENT
Attending Skin normal color for race, warm, dry and intact. No evidence of rash. Abdominal striae present.